# Patient Record
Sex: FEMALE | Race: WHITE
[De-identification: names, ages, dates, MRNs, and addresses within clinical notes are randomized per-mention and may not be internally consistent; named-entity substitution may affect disease eponyms.]

---

## 2017-04-13 ENCOUNTER — HOSPITAL ENCOUNTER (EMERGENCY)
Dept: HOSPITAL 83 - ED | Age: 82
Discharge: HOME | End: 2017-04-13
Payer: MEDICARE

## 2017-04-13 VITALS — HEIGHT: 55 IN | WEIGHT: 140 LBS

## 2017-04-13 VITALS — DIASTOLIC BLOOD PRESSURE: 90 MMHG

## 2017-04-13 DIAGNOSIS — M25.552: Primary | ICD-10-CM

## 2017-04-13 DIAGNOSIS — Z90.710: ICD-10-CM

## 2017-04-13 DIAGNOSIS — Z90.49: ICD-10-CM

## 2017-04-28 ENCOUNTER — HOSPITAL ENCOUNTER (INPATIENT)
Dept: HOSPITAL 83 - ED | Age: 82
LOS: 1 days | Discharge: HOME | DRG: 194 | End: 2017-04-29
Attending: EMERGENCY MEDICINE | Admitting: EMERGENCY MEDICINE
Payer: MEDICARE

## 2017-04-28 VITALS — BODY MASS INDEX: 29.69 KG/M2 | WEIGHT: 157.25 LBS | HEIGHT: 61 IN

## 2017-04-28 VITALS — SYSTOLIC BLOOD PRESSURE: 156 MMHG | DIASTOLIC BLOOD PRESSURE: 74 MMHG

## 2017-04-28 VITALS — DIASTOLIC BLOOD PRESSURE: 75 MMHG

## 2017-04-28 VITALS — DIASTOLIC BLOOD PRESSURE: 70 MMHG

## 2017-04-28 VITALS — DIASTOLIC BLOOD PRESSURE: 80 MMHG | SYSTOLIC BLOOD PRESSURE: 154 MMHG

## 2017-04-28 VITALS — SYSTOLIC BLOOD PRESSURE: 186 MMHG | DIASTOLIC BLOOD PRESSURE: 82 MMHG

## 2017-04-28 VITALS — SYSTOLIC BLOOD PRESSURE: 200 MMHG | DIASTOLIC BLOOD PRESSURE: 87 MMHG

## 2017-04-28 VITALS — DIASTOLIC BLOOD PRESSURE: 84 MMHG

## 2017-04-28 DIAGNOSIS — I16.1: ICD-10-CM

## 2017-04-28 DIAGNOSIS — E78.5: ICD-10-CM

## 2017-04-28 DIAGNOSIS — I47.1: ICD-10-CM

## 2017-04-28 DIAGNOSIS — G89.29: ICD-10-CM

## 2017-04-28 DIAGNOSIS — Z91.041: ICD-10-CM

## 2017-04-28 DIAGNOSIS — R09.1: Primary | ICD-10-CM

## 2017-04-28 DIAGNOSIS — I12.9: ICD-10-CM

## 2017-04-28 DIAGNOSIS — N18.3: ICD-10-CM

## 2017-04-28 DIAGNOSIS — F41.9: ICD-10-CM

## 2017-04-28 DIAGNOSIS — K21.9: ICD-10-CM

## 2017-04-28 DIAGNOSIS — G62.9: ICD-10-CM

## 2017-04-28 DIAGNOSIS — Z86.73: ICD-10-CM

## 2017-04-28 DIAGNOSIS — R07.89: ICD-10-CM

## 2017-04-28 LAB
25(OH)D3 SERPL-MCNC: 12.4 NG/ML (ref 30–100)
ALBUMIN SERPL-MCNC: 3.5 GM/DL (ref 3.1–4.5)
ALP SERPL-CCNC: 99 U/L (ref 45–117)
ALT SERPL W P-5'-P-CCNC: 17 U/L (ref 12–78)
AST SERPL-CCNC: 21 IU/L (ref 3–35)
BASOPHILS # BLD AUTO: 0.1 10*3/UL (ref 0–0.1)
BASOPHILS NFR BLD AUTO: 0.5 % (ref 0–1)
BUN SERPL-MCNC: 18 MG/DL (ref 7–24)
CHLORIDE SERPL-SCNC: 97 MMOL/L (ref 98–107)
CO2 SERPL-SCNC: 29 MMOL/L (ref 21–32)
EOSINOPHIL # BLD AUTO: 0.1 10*3/UL (ref 0–0.4)
EOSINOPHIL # BLD AUTO: 0.8 % (ref 1–4)
ERYTHROCYTE [DISTWIDTH] IN BLOOD BY AUTOMATED COUNT: 13.4 % (ref 0–14.5)
EST. AVERAGE GLUCOSE BLD GHB EST-MCNC: 105 MG/DL
FOLATE SERPL-MCNC: > 24 NG/ML (ref 5.38–?)
GLUCOSE SERPL-MCNC: 95 MG/DL (ref 65–99)
HCT VFR BLD AUTO: 36.6 % (ref 37–47)
HGB BLD-MCNC: 12 G/DL (ref 12–16)
IG #: 0.1 10*3/UL (ref 0–0.1)
INR BLD: 0.9 (ref 2–3.5)
LYMPHOCYTES # BLD AUTO: 0.9 10*3/UL (ref 1.3–4.4)
LYMPHOCYTES NFR BLD AUTO: 8.7 % (ref 27–41)
MAGNESIUM SERPL-MCNC: 2.5 MG/DL (ref 1.5–2.1)
MCH RBC QN AUTO: 30.9 PG (ref 27–31)
MCHC RBC AUTO-ENTMCNC: 32.8 G/DL (ref 33–37)
MCV RBC AUTO: 94.3 FL (ref 81–99)
MONOCYTES # BLD AUTO: 0.5 10*3/UL (ref 0.1–1)
MONOCYTES NFR BLD MANUAL: 5.1 % (ref 3–9)
NEUT #: 8.5 10*3/UL (ref 2.3–7.9)
NEUT %: 84.3 % (ref 47–73)
NRBC BLD QL AUTO: 0 % (ref 0–0)
PHOSPHATE SERPL-MCNC: 2.9 MG/DL (ref 2.5–4.9)
PLATELET # BLD AUTO: 182 10*3/UL (ref 130–400)
PMV BLD AUTO: 9.3 FL (ref 9.6–12.3)
POTASSIUM SERPL-SCNC: 3.8 MMOL/L (ref 3.5–5.1)
PROT SERPL-MCNC: 7.6 GM/DL (ref 6.4–8.2)
PROTHROMBIN TIME: 10 SECONDS (ref 9–12.4)
RBC # BLD AUTO: 3.88 10*6/UL (ref 4.1–5.1)
SODIUM SERPL-SCNC: 134 MMOL/L (ref 136–145)
T4 FREE SERPL-MCNC: 0.92 NG/DL (ref 0.76–1.46)
TSH SERPL DL<=0.005 MIU/L-ACNC: 1.09 UIU/ML (ref 0.36–4.75)
VITAMIN B12: 1021 PG/ML (ref 247–911)
WBC NRBC COR # BLD AUTO: 10.1 10*3/UL (ref 4.8–10.8)

## 2017-04-29 VITALS — SYSTOLIC BLOOD PRESSURE: 119 MMHG | DIASTOLIC BLOOD PRESSURE: 54 MMHG

## 2017-04-29 VITALS — SYSTOLIC BLOOD PRESSURE: 136 MMHG | DIASTOLIC BLOOD PRESSURE: 62 MMHG

## 2017-04-29 VITALS — DIASTOLIC BLOOD PRESSURE: 50 MMHG

## 2017-04-29 LAB
BASOPHILS # BLD AUTO: 0.1 10*3/UL (ref 0–0.1)
BASOPHILS NFR BLD AUTO: 0.8 % (ref 0–1)
BUN SERPL-MCNC: 14 MG/DL (ref 7–24)
CHLORIDE SERPL-SCNC: 96 MMOL/L (ref 98–107)
CO2 SERPL-SCNC: 32 MMOL/L (ref 21–32)
EOSINOPHIL # BLD AUTO: 0.4 10*3/UL (ref 0–0.4)
EOSINOPHIL # BLD AUTO: 5.7 % (ref 1–4)
ERYTHROCYTE [DISTWIDTH] IN BLOOD BY AUTOMATED COUNT: 13.5 % (ref 0–14.5)
GLUCOSE SERPL-MCNC: 82 MG/DL (ref 65–99)
HCT VFR BLD AUTO: 33.7 % (ref 37–47)
HGB BLD-MCNC: 10.8 G/DL (ref 12–16)
IG #: 0 10*3/UL (ref 0–0.1)
LYMPHOCYTES # BLD AUTO: 1.3 10*3/UL (ref 1.3–4.4)
LYMPHOCYTES NFR BLD AUTO: 19.8 % (ref 27–41)
MCH RBC QN AUTO: 30.3 PG (ref 27–31)
MCHC RBC AUTO-ENTMCNC: 32 G/DL (ref 33–37)
MCV RBC AUTO: 94.7 FL (ref 81–99)
MONOCYTES # BLD AUTO: 0.7 10*3/UL (ref 0.1–1)
MONOCYTES NFR BLD MANUAL: 11.1 % (ref 3–9)
NEUT #: 4 10*3/UL (ref 2.3–7.9)
NEUT %: 62 % (ref 47–73)
NRBC BLD QL AUTO: 0 10*3/UL (ref 0–0)
PLATELET # BLD AUTO: 189 10*3/UL (ref 130–400)
PMV BLD AUTO: 9.6 FL (ref 9.6–12.3)
POTASSIUM SERPL-SCNC: 3.2 MMOL/L (ref 3.5–5.1)
RBC # BLD AUTO: 3.56 10*6/UL (ref 4.1–5.1)
SODIUM SERPL-SCNC: 136 MMOL/L (ref 136–145)
TROPONIN I SERPL-MCNC: < 0.015 NG/ML (ref ?–0.04)
WBC NRBC COR # BLD AUTO: 6.5 10*3/UL (ref 4.8–10.8)

## 2017-05-04 ENCOUNTER — HOSPITAL ENCOUNTER (OUTPATIENT)
Dept: HOSPITAL 83 - RAD | Age: 82
Discharge: HOME | End: 2017-05-04
Attending: INTERNAL MEDICINE
Payer: MEDICARE

## 2017-05-04 DIAGNOSIS — M54.2: Primary | ICD-10-CM

## 2017-09-27 ENCOUNTER — HOSPITAL ENCOUNTER (OUTPATIENT)
Dept: HOSPITAL 83 - LAB | Age: 82
Discharge: HOME | End: 2017-09-27
Payer: MEDICARE

## 2017-09-27 DIAGNOSIS — I47.1: Primary | ICD-10-CM

## 2017-09-27 LAB
ALBUMIN SERPL-MCNC: 3.9 GM/DL (ref 3.1–4.5)
ALP SERPL-CCNC: 82 U/L (ref 45–117)
ALT SERPL W P-5'-P-CCNC: 29 U/L (ref 12–78)
AST SERPL-CCNC: 43 IU/L (ref 3–35)
BASOPHILS # BLD AUTO: 0.1 10*3/UL (ref 0–0.1)
BASOPHILS NFR BLD AUTO: 1.1 % (ref 0–1)
BUN SERPL-MCNC: 12 MG/DL (ref 7–24)
CHLORIDE SERPL-SCNC: 101 MMOL/L (ref 98–107)
CREAT SERPL-MCNC: 1.27 MG/DL (ref 0.55–1.02)
DIGOXIN SERPL-MCNC: 1.25 NG/ML (ref 0.8–2)
EOSINOPHIL # BLD AUTO: 0.1 10*3/UL (ref 0–0.4)
EOSINOPHIL # BLD AUTO: 0.8 % (ref 1–4)
ERYTHROCYTE [DISTWIDTH] IN BLOOD BY AUTOMATED COUNT: 12.7 % (ref 0–14.5)
HCT VFR BLD AUTO: 41.3 % (ref 37–47)
HGB BLD-MCNC: 13.2 G/DL (ref 12–16)
LYMPHOCYTES # BLD AUTO: 1.5 10*3/UL (ref 1.3–4.4)
LYMPHOCYTES NFR BLD AUTO: 22.9 % (ref 27–41)
MCH RBC QN AUTO: 30.3 PG (ref 27–31)
MCHC RBC AUTO-ENTMCNC: 32 G/DL (ref 33–37)
MCV RBC AUTO: 94.7 FL (ref 81–99)
MONOCYTES # BLD AUTO: 0.5 10*3/UL (ref 0.1–1)
MONOCYTES NFR BLD MANUAL: 8.2 % (ref 3–9)
NEUT #: 4.2 10*3/UL (ref 2.3–7.9)
NEUT %: 66.8 % (ref 47–73)
NRBC BLD QL AUTO: 0 10*3/UL (ref 0–0)
PLATELET # BLD AUTO: 209 10*3/UL (ref 130–400)
PMV BLD AUTO: 9.9 FL (ref 9.6–12.3)
POTASSIUM SERPL-SCNC: 3.1 MMOL/L (ref 3.5–5.1)
PROT SERPL-MCNC: 7.8 GM/DL (ref 6.4–8.2)
RBC # BLD AUTO: 4.36 10*6/UL (ref 4.1–5.1)
SODIUM SERPL-SCNC: 140 MMOL/L (ref 136–145)
WBC NRBC COR # BLD AUTO: 6.3 10*3/UL (ref 4.8–10.8)

## 2017-11-24 ENCOUNTER — HOSPITAL ENCOUNTER (INPATIENT)
Dept: HOSPITAL 83 - ED | Age: 82
LOS: 3 days | Discharge: HOME | DRG: 281 | End: 2017-11-27
Attending: INTERNAL MEDICINE | Admitting: INTERNAL MEDICINE
Payer: MEDICARE

## 2017-11-24 VITALS — DIASTOLIC BLOOD PRESSURE: 80 MMHG

## 2017-11-24 VITALS — DIASTOLIC BLOOD PRESSURE: 51 MMHG | SYSTOLIC BLOOD PRESSURE: 128 MMHG

## 2017-11-24 VITALS — DIASTOLIC BLOOD PRESSURE: 83 MMHG | SYSTOLIC BLOOD PRESSURE: 124 MMHG

## 2017-11-24 VITALS — SYSTOLIC BLOOD PRESSURE: 124 MMHG | DIASTOLIC BLOOD PRESSURE: 78 MMHG

## 2017-11-24 VITALS — SYSTOLIC BLOOD PRESSURE: 125 MMHG | DIASTOLIC BLOOD PRESSURE: 80 MMHG

## 2017-11-24 VITALS — BODY MASS INDEX: 27.12 KG/M2 | HEIGHT: 60 IN | WEIGHT: 138.13 LBS

## 2017-11-24 VITALS — DIASTOLIC BLOOD PRESSURE: 80 MMHG | SYSTOLIC BLOOD PRESSURE: 125 MMHG

## 2017-11-24 VITALS — SYSTOLIC BLOOD PRESSURE: 128 MMHG | DIASTOLIC BLOOD PRESSURE: 51 MMHG

## 2017-11-24 VITALS — DIASTOLIC BLOOD PRESSURE: 78 MMHG

## 2017-11-24 VITALS — DIASTOLIC BLOOD PRESSURE: 82 MMHG | SYSTOLIC BLOOD PRESSURE: 128 MMHG

## 2017-11-24 DIAGNOSIS — R73.9: ICD-10-CM

## 2017-11-24 DIAGNOSIS — N18.3: ICD-10-CM

## 2017-11-24 DIAGNOSIS — D64.9: ICD-10-CM

## 2017-11-24 DIAGNOSIS — I50.32: ICD-10-CM

## 2017-11-24 DIAGNOSIS — Z79.899: ICD-10-CM

## 2017-11-24 DIAGNOSIS — E66.3: ICD-10-CM

## 2017-11-24 DIAGNOSIS — B34.9: ICD-10-CM

## 2017-11-24 DIAGNOSIS — Z98.49: ICD-10-CM

## 2017-11-24 DIAGNOSIS — N17.9: ICD-10-CM

## 2017-11-24 DIAGNOSIS — M48.00: ICD-10-CM

## 2017-11-24 DIAGNOSIS — E55.9: ICD-10-CM

## 2017-11-24 DIAGNOSIS — E44.1: ICD-10-CM

## 2017-11-24 DIAGNOSIS — Z79.82: ICD-10-CM

## 2017-11-24 DIAGNOSIS — E86.0: ICD-10-CM

## 2017-11-24 DIAGNOSIS — Z90.49: ICD-10-CM

## 2017-11-24 DIAGNOSIS — G89.29: ICD-10-CM

## 2017-11-24 DIAGNOSIS — F41.9: ICD-10-CM

## 2017-11-24 DIAGNOSIS — E78.5: ICD-10-CM

## 2017-11-24 DIAGNOSIS — K52.9: ICD-10-CM

## 2017-11-24 DIAGNOSIS — I13.0: ICD-10-CM

## 2017-11-24 DIAGNOSIS — G62.9: ICD-10-CM

## 2017-11-24 DIAGNOSIS — Z82.49: ICD-10-CM

## 2017-11-24 DIAGNOSIS — Z86.73: ICD-10-CM

## 2017-11-24 DIAGNOSIS — I48.0: ICD-10-CM

## 2017-11-24 DIAGNOSIS — I21.A1: Primary | ICD-10-CM

## 2017-11-24 DIAGNOSIS — Z82.3: ICD-10-CM

## 2017-11-24 DIAGNOSIS — Z91.041: ICD-10-CM

## 2017-11-24 DIAGNOSIS — Z90.710: ICD-10-CM

## 2017-11-24 DIAGNOSIS — E87.6: ICD-10-CM

## 2017-11-24 DIAGNOSIS — K21.9: ICD-10-CM

## 2017-11-24 LAB
ALBUMIN SERPL-MCNC: 3.3 GM/DL (ref 3.1–4.5)
ALP SERPL-CCNC: 76 U/L (ref 45–117)
ALT SERPL W P-5'-P-CCNC: 15 U/L (ref 12–78)
APTT PPP: 25.2 SECONDS (ref 20.8–31.5)
AST SERPL-CCNC: 17 IU/L (ref 3–35)
BASOPHILS # BLD AUTO: 0 10*3/UL (ref 0–0.1)
BASOPHILS NFR BLD AUTO: 0.3 % (ref 0–1)
BUN SERPL-MCNC: 24 MG/DL (ref 7–24)
CHLORIDE SERPL-SCNC: 102 MMOL/L (ref 98–107)
CREAT SERPL-MCNC: 1.1 MG/DL (ref 0.55–1.02)
EOSINOPHIL # BLD AUTO: 0 10*3/UL (ref 0–0.4)
EOSINOPHIL # BLD AUTO: 0.2 % (ref 1–4)
ERYTHROCYTE [DISTWIDTH] IN BLOOD BY AUTOMATED COUNT: 13.3 % (ref 0–14.5)
HCT VFR BLD AUTO: 39.2 % (ref 37–47)
HGB BLD-MCNC: 12.6 G/DL (ref 12–16)
INR BLD: 1 (ref 2–3.5)
LYMPHOCYTES # BLD AUTO: 0.3 10*3/UL (ref 1.3–4.4)
LYMPHOCYTES NFR BLD AUTO: 4.4 % (ref 27–41)
MCH RBC QN AUTO: 30.5 PG (ref 27–31)
MCHC RBC AUTO-ENTMCNC: 32.1 G/DL (ref 33–37)
MCV RBC AUTO: 94.9 FL (ref 81–99)
MONOCYTES # BLD AUTO: 0.3 10*3/UL (ref 0.1–1)
MONOCYTES NFR BLD MANUAL: 5.6 % (ref 3–9)
NEUT #: 5.3 10*3/UL (ref 2.3–7.9)
NEUT %: 89.3 % (ref 47–73)
NRBC BLD QL AUTO: 0 10*3/UL (ref 0–0)
PLATELET # BLD AUTO: 156 10*3/UL (ref 130–400)
PMV BLD AUTO: 10.3 FL (ref 9.6–12.3)
POTASSIUM SERPL-SCNC: 3.8 MMOL/L (ref 3.5–5.1)
PROT SERPL-MCNC: 7.6 GM/DL (ref 6.4–8.2)
RBC # BLD AUTO: 4.13 10*6/UL (ref 4.1–5.1)
SODIUM SERPL-SCNC: 139 MMOL/L (ref 136–145)
TROPONIN I SERPL-MCNC: < 0.015 NG/ML (ref ?–0.04)
WBC NRBC COR # BLD AUTO: 5.9 10*3/UL (ref 4.8–10.8)

## 2017-11-24 NOTE — PR
[place], [place]
 
                                      PROGRESS NOTE
 
        NAME: ELI ROSENTHAL                   ACCT #: [id number]  
        UNIT #: [id number]                       ROOM: [facility]-    
        DOCTOR: SHANITA COOK MD              BIRTHDATE: [birth year]
 
 
DOS: 11/27/2017
 
SUBJECTIVE:  The patient is sitting up in bed, does not appear in distress.  She
is slightly hard of hearing, but responds appropriately to verbal command.  No
symptomatic palpitation.  No chest pain.  No chest pressure.
 
OBJECTIVE:
VITAL SIGNS:  Blood pressure 145/67, heart rate 65, respiratory rate of 14,
temperature 97.5.
NECK:  Good upstroke.  No bruit.
HEART:  S1, S2 with no rub.
LUNGS:  Clear to auscultation.
ABDOMEN:  Soft, nontender, present bowel sounds.
LOWER EXTREMITIES:  No edema.
 
LABORATORY DATA:  White count 5.5, hemoglobin 11.6.  Potassium 3.6, GFR more
than 60%.
 
ASSESSMENT AND PLAN:  Chest pain with abnormal troponin in a patient who had a
completely normal stress test in March of this year.  The patient currently is
pain free.  Echocardiogram is still pending.  The patient on proper medication
with no room to titrate beta blocker.  Our plan at this time is to hold on any
further workup pending the results of the echocardiogram.  Should the
echocardiogram show normal LV function, no wall motion abnormalities, the
patient can be discharged home with early followup with  ____ as an
outpatient within 1-2 weeks.  The patient was advised to call back for any
recurrent chest pain at any time.
 
 
 
_________________________________
SHANITA COOK MD
 
CM:PNTRANS
 
D: 11/27/17 1040                 
T: 11/27/17 1118
             
                                                            
SHANITA COOK MD              
                                                            
11/29/17
1548
                              
interface

## 2017-11-24 NOTE — NUR
A 83, admitted to , under the
services of JENNIFER Blancas DO with a diagnosis of CHEST PAIN, ACUTE
KIDNEY INJURY, AFIB, HYPOKALCEMIA.
Chief complaint is CHEST PAIN.
Patient arrived via bed from ER.
Monitor applied. Initial assessment completed.
Vital signs taken and recorded.
JENNIFER BLANCAS DO notified of admission to the unit.
Orders received.
See assessment for past medical history, medications
and allergies.
Patient and/or family oriented to unit. [facility] reviewed.
Clothing/patient valuable form completed.
 
CRISTO FLORES

## 2017-11-24 NOTE — NUR
PT MEDICATED FOR PAIN PER DOCTORS ORDERS. PRIOR TO MED ADMINISTRATION PT RATES
PAIN 8/10. PT RESTING IN BED. VSS. PULSE OX APPLIED. WILL CONTINUE TO MONITOR.

## 2017-11-24 NOTE — NUR
DR YUN NOTIFIED OF ELEVATED TROPONIN. INSTRUCTED TO MAKE SURE CARDIO IS
AWARE. DR LOMELI RETURNED PAGE AND SAID WE DO NOT NEED TO CALL WITH ANYMORE
ELEVATED TROPONINS UNLESS SHE IS HAVING CHEST PAIN.

## 2017-11-24 NOTE — NUR
ANSWERING SERVICE WAS NOTIFIED OF DR. LORA
CONSULT.  RESPONSE OF NOTIFICATION
WAS OKAY THANK YOU.
 
CRISTO FLORES

## 2017-11-25 VITALS — DIASTOLIC BLOOD PRESSURE: 68 MMHG

## 2017-11-25 VITALS — DIASTOLIC BLOOD PRESSURE: 64 MMHG | SYSTOLIC BLOOD PRESSURE: 131 MMHG

## 2017-11-25 VITALS — DIASTOLIC BLOOD PRESSURE: 66 MMHG

## 2017-11-25 VITALS — DIASTOLIC BLOOD PRESSURE: 56 MMHG | SYSTOLIC BLOOD PRESSURE: 126 MMHG

## 2017-11-25 VITALS — DIASTOLIC BLOOD PRESSURE: 74 MMHG

## 2017-11-25 LAB
25(OH)D3 SERPL-MCNC: 25.3 NG/ML (ref 30–100)
ALBUMIN SERPL-MCNC: 3 GM/DL (ref 3.1–4.5)
ALP SERPL-CCNC: 66 U/L (ref 45–117)
ALT SERPL W P-5'-P-CCNC: 15 U/L (ref 12–78)
APPEARANCE UR: (no result)
APTT PPP: 29.7 SECONDS (ref 20.8–31.5)
AST SERPL-CCNC: 18 IU/L (ref 3–35)
BASOPHILS # BLD AUTO: 0 10*3/UL (ref 0–0.1)
BASOPHILS NFR BLD AUTO: 0.2 % (ref 0–1)
BILIRUB UR QL STRIP: NEGATIVE
BUN SERPL-MCNC: 25 MG/DL (ref 7–24)
CHLORIDE SERPL-SCNC: 103 MMOL/L (ref 98–107)
CHOLEST SERPL-MCNC: 196 MG/DL (ref ?–200)
COLOR UR: YELLOW
CREAT SERPL-MCNC: 1.12 MG/DL (ref 0.55–1.02)
EOSINOPHIL # BLD AUTO: 0 10*3/UL (ref 0–0.4)
EOSINOPHIL # BLD AUTO: 0.2 % (ref 1–4)
ERYTHROCYTE [DISTWIDTH] IN BLOOD BY AUTOMATED COUNT: 13.6 % (ref 0–14.5)
GLUCOSE UR QL: NEGATIVE
HCT VFR BLD AUTO: 36.7 % (ref 37–47)
HDLC SERPL-MCNC: 53 MG/DL (ref 40–60)
HGB BLD-MCNC: 11.6 G/DL (ref 12–16)
HGB UR QL STRIP: NEGATIVE
INR BLD: 1 (ref 2–3.5)
KETONES UR QL STRIP: (no result)
LDLC SERPL DIRECT ASSAY-MCNC: 113 MG/DL (ref 9–159)
LEUKOCYTE ESTERASE UR QL STRIP: NEGATIVE
LYMPHOCYTES # BLD AUTO: 0.6 10*3/UL (ref 1.3–4.4)
LYMPHOCYTES NFR BLD AUTO: 10.3 % (ref 27–41)
MCH RBC QN AUTO: 29.9 PG (ref 27–31)
MCHC RBC AUTO-ENTMCNC: 31.6 G/DL (ref 33–37)
MCV RBC AUTO: 94.6 FL (ref 81–99)
MONOCYTES # BLD AUTO: 0.4 10*3/UL (ref 0.1–1)
MONOCYTES NFR BLD MANUAL: 7 % (ref 3–9)
MUCOUS THREADS URNS QL MICRO: (no result)
NEUT #: 4.5 10*3/UL (ref 2.3–7.9)
NEUT %: 82.1 % (ref 47–73)
NITRITE UR QL STRIP: NEGATIVE
NRBC BLD QL AUTO: 0 10*3/UL (ref 0–0)
PH UR STRIP: 6.5 [PH] (ref 5–9)
PHOSPHATE SERPL-MCNC: 2.2 MG/DL (ref 2.5–4.9)
PLATELET # BLD AUTO: 140 10*3/UL (ref 130–400)
PMV BLD AUTO: 10.4 FL (ref 9.6–12.3)
POTASSIUM SERPL-SCNC: 3.2 MMOL/L (ref 3.5–5.1)
PROT SERPL-MCNC: 6.6 GM/DL (ref 6.4–8.2)
RBC # BLD AUTO: 3.88 10*6/UL (ref 4.1–5.1)
RBC #/AREA URNS HPF: (no result) RBC/HPF (ref 0–2)
SODIUM SERPL-SCNC: 140 MMOL/L (ref 136–145)
SP GR UR: 1.02 (ref 1–1.03)
T4 FREE SERPL-MCNC: 0.9 NG/DL (ref 0.76–1.46)
TRIGL SERPL-MCNC: 149 MG/DL (ref ?–150)
TSH SERPL DL<=0.005 MIU/L-ACNC: 0.45 UIU/ML (ref 0.36–4.75)
UROBILINOGEN UR STRIP-MCNC: 0.2 E.U./DL (ref 0.2–1)
VITAMIN B12: 1018 PG/ML (ref 247–911)
VLDLC SERPL CALC-MCNC: 30 MG/DL (ref 6–40)
WBC #/AREA URNS HPF: (no result) WBC/HPF (ref 0–5)
WBC NRBC COR # BLD AUTO: 5.5 10*3/UL (ref 4.8–10.8)

## 2017-11-25 NOTE — NUR
PATIENT STATES EARLIER ATIVAN "HELPED A LOT"
PATIENT RESTING QUIETLY IN BED, RESPIRATIONS EASY/REG. NO SXS OF DISTRESS.
FLUIDS AND CARDIZEM GTT MAINTAINED PER ORDER.

## 2017-11-25 NOTE — NUR
PATIENT MOVED TO THE ICCU WITH ALL BELONGINGS. DAUGHTER WAS NOTIFIED OF THE
PATIENTS CHANGE. VSS STABLE, PATIENT RESTING COMFORTABLY.

## 2017-11-25 NOTE — NUR
PT MEDICATED WITH ATIVAN 0.5MG PO FOR ANXIETY AND RESTLESSNESS. GRANDAUGHTER
AT BEDSIDE AND STATES SHE ALWAYS GETS LIKE THIS WHEN SHE IS IN THE HOSPITAL.

## 2017-11-25 NOTE — NUR
LAB CALLED WITH TROP 0.177. DR LOMELI REQUESTED NOT TO BE NOTIFIED UNLESS
PATIENT IS HAVING CHEST PAIN.

## 2017-11-25 NOTE — NUR
PT REQUESTING "SOMETHING TO SETTLE MY NERVES".... I PHONED DR BAEZ AND
RECEIVED ORDER.  CONVEYED TO PT.

## 2017-11-25 NOTE — NUR
DR LOMELI MADE AWARE OF PT'S IODINE ALLERGY WITH REGARDS TO HER ORDERED CTA.
PREDNISONE PREP FOR 13 HOURS ORDERED. CT TECH NOTIFIED AND WILL PLAN FOR CTA
APPROX 4AM TOMORROW. FAMILY AWARE.

## 2017-11-25 NOTE — NUR
DR MARROQUIN MADE AWARE OF PT BECOMING INCREASINGLY ANXIOUS AND AGITATED. PT
REFUSING TO USE BSC DESPITE STAFF EXPLAINING THAT CURRENTLY THERE ARE NO ICU
ROOMS AVAILABLE WITH A BATHROOM. THEN YELLING AT STAFF TO JUST GO AWAY BECAUSE
SHE JUST WANTS TO GO HOME AND NO ONE IS EXPLAINING THINGS TO HER, EVEN THOUGH
DR MARROQUIN WAS LITERALLY AT HER BEDSIDE 2 MINUTES AGO EXPLAINING WHY SHE WAS
SENT TO ICCU.

## 2017-11-25 NOTE — NUR
ATIVAN WAS GIVEN AT 1935 AS ORDERED.  PT COOPERATIVE AND PLEASANT.  I ASSESSED
HER WHILE IN HER CHAIR AND ASSISTED HER BACK TO BED.  CALL LIGHT IN REACH AND
INSTRUCTED PT TO CALL ME IF SHE NEEDS TO GET UP TO BSC.  BED EXIT ALARM ON FOR
SAFETY AS WELL.  PT STATES ATIVAN IS ALREADY EFFECTIVE AND SHE IS FEELING MUCH
CALMER.

## 2017-11-26 VITALS — DIASTOLIC BLOOD PRESSURE: 59 MMHG | SYSTOLIC BLOOD PRESSURE: 123 MMHG

## 2017-11-26 VITALS — DIASTOLIC BLOOD PRESSURE: 70 MMHG

## 2017-11-26 VITALS — DIASTOLIC BLOOD PRESSURE: 60 MMHG

## 2017-11-26 VITALS — DIASTOLIC BLOOD PRESSURE: 61 MMHG

## 2017-11-26 VITALS — SYSTOLIC BLOOD PRESSURE: 125 MMHG | DIASTOLIC BLOOD PRESSURE: 65 MMHG

## 2017-11-26 VITALS — DIASTOLIC BLOOD PRESSURE: 63 MMHG

## 2017-11-26 LAB
BUN SERPL-MCNC: 23 MG/DL (ref 7–24)
CHLORIDE SERPL-SCNC: 102 MMOL/L (ref 98–107)
CREAT SERPL-MCNC: 0.98 MG/DL (ref 0.55–1.02)
POTASSIUM SERPL-SCNC: 3.6 MMOL/L (ref 3.5–5.1)
SODIUM SERPL-SCNC: 137 MMOL/L (ref 136–145)

## 2017-11-26 NOTE — NUR
MORPHINE FOR BACK.LEG PAIN HAS BEEN INEFFECTIVE
TYLENOL/ZOFRAN/ATIVAN NOW GIVEN FOR PAIN/ANXIETY/GITTERU STOMACH
 IN ROOM AND REQUESTING TO SPEAK WITH THE DR DR PURI UPDATED ON THIS

## 2017-11-26 NOTE — NUR
PT. GIVEN PERCOSET AND ATIVAN AS ORDERED AT 2007 AS PER REQUEST FOR PAIN MED
FOR GENERALIZED ACHES AND PAINS.  ATIVAN GIVEN AS ORDERED PER REQUEST FOR
ANXIETY MED.  PT. UP TO BSC WITH ASSIST.  HEP LOCK IN LUIS F AND RA ASYMPT. LUNGS
CLEAR BILAT, PULSE OX 97% ON RA. ABDOMEN SOFT, NONDISTENDED AND NORMO. TRACE
BILAT LOWER EXTREMITY EDEMA.
BOLA ALVAREZ RN

## 2017-11-27 VITALS — DIASTOLIC BLOOD PRESSURE: 67 MMHG | SYSTOLIC BLOOD PRESSURE: 145 MMHG

## 2017-11-27 VITALS — DIASTOLIC BLOOD PRESSURE: 64 MMHG | SYSTOLIC BLOOD PRESSURE: 157 MMHG

## 2017-11-27 VITALS — DIASTOLIC BLOOD PRESSURE: 61 MMHG

## 2017-11-27 VITALS — SYSTOLIC BLOOD PRESSURE: 126 MMHG | DIASTOLIC BLOOD PRESSURE: 57 MMHG

## 2017-11-27 VITALS — DIASTOLIC BLOOD PRESSURE: 63 MMHG

## 2017-11-27 NOTE — NUR
PT DISCHARGED HOME, HOME CARE AND NEW MEDS DISCUSSED WITH STR THAT WAS HERE
AND SHE VERBALIZED GOOD UNDERSTANDING, RESIDENT CLINIC NUMBER ALSO GIVEN TO PT
AT HER REQUEST

## 2017-11-27 NOTE — NUR
PERCOCET FOR 10/10 JOINT PAIN GINGER HER KNEES
IV ATIVAN FOR SEVERE AGITATION, PT IS FURIOUS THAT SHE HAS NOT BEEN DISCHARGED
YET

## 2017-11-27 NOTE — NUR
SPOKE WITH VESTA FROM [facility] AND SHE RECIEVED A TEXT FROM DR COOK THAT
PTS ECHO WAS OK, AND PT CAN GO HOME TODAY FROM CARDIOLOGY POINT OF VIEW
DR PURI UPDATED

## 2017-11-27 NOTE — NUR
in to talk to patient.
Patient states lives at home with .
There are few steps in the home.
Physician: conrad gordillo
Pharmacy: [facility] health services: none
Patient's level of ADLs: INDEPENDENT
Patient has working utilities: all working
DME: has a cane and walker
Follow-up physician's appointment after d/c: will be made by hospitalist nurse
director upon discharge
Does patient want to access PORTAL?: no
Discharge plan discussed with patient,  and daughter present, patient
lives at home with , she states she is independent in adls and
ambulation, patient states she will be going home when able, discussed with her
[facility] and she refused any services at this time.
 
SEBASTIAN HASSAN

## 2017-11-27 NOTE — NUR
PTS FAMILY ADVISED OF HER PROBABLY NOT BEING DISCHARGED TODAY
PTS OTHER DTR, CALLED CARDIOLOGY SERVICE ON HER OWN
DR COOK CALLED IN AND WAS UPSET ON PT NOT BEING DISCHARGED AND DR PURI, WHO
WAS HERE IN THE UNIT TO SPEAK WITH THIS FAMILY, SPOKE WITH DR COOK ON THE
PHONE

## 2018-01-12 ENCOUNTER — HOSPITAL ENCOUNTER (OUTPATIENT)
Dept: HOSPITAL 83 - LAB | Age: 83
Discharge: HOME | End: 2018-01-12
Attending: INTERNAL MEDICINE
Payer: MEDICARE

## 2018-01-12 DIAGNOSIS — I10: Primary | ICD-10-CM

## 2018-01-12 DIAGNOSIS — E78.2: ICD-10-CM

## 2018-01-12 LAB
ALBUMIN SERPL-MCNC: 3.3 GM/DL (ref 3.1–4.5)
ALP SERPL-CCNC: 75 U/L (ref 45–117)
ALT SERPL W P-5'-P-CCNC: 13 U/L (ref 12–78)
AST SERPL-CCNC: 16 IU/L (ref 3–35)
BASOPHILS # BLD AUTO: 0.1 10*3/UL (ref 0–0.1)
BASOPHILS NFR BLD AUTO: 2 % (ref 0–1)
BUN SERPL-MCNC: 23 MG/DL (ref 7–24)
CHLORIDE SERPL-SCNC: 103 MMOL/L (ref 98–107)
CHOLEST SERPL-MCNC: 263 MG/DL (ref ?–200)
CREAT SERPL-MCNC: 1.32 MG/DL (ref 0.55–1.02)
EOSINOPHIL # BLD AUTO: 0.3 10*3/UL (ref 0–0.4)
EOSINOPHIL # BLD AUTO: 6.3 % (ref 1–4)
ERYTHROCYTE [DISTWIDTH] IN BLOOD BY AUTOMATED COUNT: 13.7 % (ref 0–14.5)
HCT VFR BLD AUTO: 34.4 % (ref 37–47)
HDLC SERPL-MCNC: 58 MG/DL (ref 40–60)
HGB BLD-MCNC: 10.8 G/DL (ref 12–16)
LDLC SERPL DIRECT ASSAY-MCNC: 170 MG/DL (ref 9–159)
LYMPHOCYTES # BLD AUTO: 1.9 10*3/UL (ref 1.3–4.4)
LYMPHOCYTES NFR BLD AUTO: 37.5 % (ref 27–41)
MCH RBC QN AUTO: 29.9 PG (ref 27–31)
MCHC RBC AUTO-ENTMCNC: 31.4 G/DL (ref 33–37)
MCV RBC AUTO: 95.3 FL (ref 81–99)
MONOCYTES # BLD AUTO: 0.5 10*3/UL (ref 0.1–1)
MONOCYTES NFR BLD MANUAL: 9.9 % (ref 3–9)
NEUT #: 2.2 10*3/UL (ref 2.3–7.9)
NEUT %: 44.1 % (ref 47–73)
NRBC BLD QL AUTO: 0 10*3/UL (ref 0–0)
PLATELET # BLD AUTO: 179 10*3/UL (ref 130–400)
PMV BLD AUTO: 10.1 FL (ref 9.6–12.3)
POTASSIUM SERPL-SCNC: 3.7 MMOL/L (ref 3.5–5.1)
PROT SERPL-MCNC: 7.1 GM/DL (ref 6.4–8.2)
RBC # BLD AUTO: 3.61 10*6/UL (ref 4.1–5.1)
SODIUM SERPL-SCNC: 143 MMOL/L (ref 136–145)
TRIGL SERPL-MCNC: 173 MG/DL (ref ?–150)
TSH SERPL DL<=0.005 MIU/L-ACNC: 1.74 UIU/ML (ref 0.36–4.75)
VLDLC SERPL CALC-MCNC: 35 MG/DL (ref 6–40)
WBC NRBC COR # BLD AUTO: 5.1 10*3/UL (ref 4.8–10.8)

## 2018-01-18 ENCOUNTER — HOSPITAL ENCOUNTER (OUTPATIENT)
Dept: HOSPITAL 83 - LAB | Age: 83
Discharge: HOME | End: 2018-01-18
Attending: INTERNAL MEDICINE
Payer: MEDICARE

## 2018-01-18 DIAGNOSIS — D64.9: Primary | ICD-10-CM

## 2018-01-18 LAB — VITAMIN B12: 1511 PG/ML (ref 247–911)

## 2018-02-19 ENCOUNTER — HOSPITAL ENCOUNTER (OUTPATIENT)
Dept: HOSPITAL 83 - RAD | Age: 83
Discharge: HOME | End: 2018-02-19
Attending: INTERNAL MEDICINE
Payer: MEDICARE

## 2018-02-19 DIAGNOSIS — M25.552: ICD-10-CM

## 2018-02-19 DIAGNOSIS — M25.511: Primary | ICD-10-CM

## 2018-04-17 ENCOUNTER — HOSPITAL ENCOUNTER (OUTPATIENT)
Dept: HOSPITAL 83 - LAB | Age: 83
Discharge: HOME | End: 2018-04-17
Attending: ORTHOPAEDIC SURGERY
Payer: MEDICARE

## 2018-04-17 DIAGNOSIS — R35.0: ICD-10-CM

## 2018-04-17 DIAGNOSIS — D64.9: ICD-10-CM

## 2018-04-17 DIAGNOSIS — I10: ICD-10-CM

## 2018-04-17 DIAGNOSIS — Z01.818: Primary | ICD-10-CM

## 2018-04-17 DIAGNOSIS — E78.00: ICD-10-CM

## 2018-04-17 DIAGNOSIS — Z86.73: ICD-10-CM

## 2018-04-17 DIAGNOSIS — R91.8: ICD-10-CM

## 2018-04-17 LAB
ALBUMIN SERPL-MCNC: 3.7 GM/DL (ref 3.1–4.5)
ALP SERPL-CCNC: 87 U/L (ref 45–117)
ALT SERPL W P-5'-P-CCNC: 20 U/L (ref 12–78)
APPEARANCE UR: (no result)
APTT PPP: 19.9 SECONDS (ref 20.8–31.5)
AST SERPL-CCNC: 17 IU/L (ref 3–35)
BACTERIA #/AREA URNS HPF: (no result) /[HPF]
BASOPHILS # BLD AUTO: 0 10*3/UL (ref 0–0.1)
BASOPHILS NFR BLD AUTO: 0.2 % (ref 0–1)
BILIRUB UR QL STRIP: NEGATIVE
BUN SERPL-MCNC: 28 MG/DL (ref 7–24)
CHLORIDE SERPL-SCNC: 102 MMOL/L (ref 98–107)
COLOR UR: YELLOW
CREAT SERPL-MCNC: 1.19 MG/DL (ref 0.55–1.02)
EOSINOPHIL # BLD AUTO: 0 10*3/UL (ref 0–0.4)
EOSINOPHIL # BLD AUTO: 0.4 % (ref 1–4)
ERYTHROCYTE [DISTWIDTH] IN BLOOD BY AUTOMATED COUNT: 14.1 % (ref 0–14.5)
GLUCOSE UR QL: NEGATIVE
HCT VFR BLD AUTO: 41.1 % (ref 37–47)
HGB BLD-MCNC: 12.7 G/DL (ref 12–16)
HGB UR QL STRIP: NEGATIVE
INR BLD: 0.9 (ref 2–3.5)
KETONES UR QL STRIP: NEGATIVE
LEUKOCYTE ESTERASE UR QL STRIP: NEGATIVE
LYMPHOCYTES # BLD AUTO: 2.2 10*3/UL (ref 1.3–4.4)
LYMPHOCYTES NFR BLD AUTO: 21.2 % (ref 27–41)
MCH RBC QN AUTO: 29.7 PG (ref 27–31)
MCHC RBC AUTO-ENTMCNC: 30.9 G/DL (ref 33–37)
MCV RBC AUTO: 96 FL (ref 81–99)
MONOCYTES # BLD AUTO: 1 10*3/UL (ref 0.1–1)
MONOCYTES NFR BLD MANUAL: 9.7 % (ref 3–9)
MUCOUS THREADS URNS QL MICRO: (no result)
NEUT #: 6.8 10*3/UL (ref 2.3–7.9)
NEUT %: 67.1 % (ref 47–73)
NITRITE UR QL STRIP: NEGATIVE
NRBC BLD QL AUTO: 0 10*3/UL (ref 0–0)
PH UR STRIP: 5.5 [PH] (ref 5–9)
PLATELET # BLD AUTO: 279 10*3/UL (ref 130–400)
PMV BLD AUTO: 9.7 FL (ref 9.6–12.3)
POTASSIUM SERPL-SCNC: 3.4 MMOL/L (ref 3.5–5.1)
PROT SERPL-MCNC: 7.5 GM/DL (ref 6.4–8.2)
RBC # BLD AUTO: 4.28 10*6/UL (ref 4.1–5.1)
RBC #/AREA URNS HPF: (no result) RBC/HPF (ref 0–2)
SODIUM SERPL-SCNC: 141 MMOL/L (ref 136–145)
SP GR UR: 1.02 (ref 1–1.03)
UROBILINOGEN UR STRIP-MCNC: 0.2 E.U./DL (ref 0.2–1)
WBC #/AREA URNS HPF: (no result) WBC/HPF (ref 0–5)
WBC NRBC COR # BLD AUTO: 10.2 10*3/UL (ref 4.8–10.8)

## 2018-04-23 ENCOUNTER — HOSPITAL ENCOUNTER (EMERGENCY)
Dept: HOSPITAL 83 - ED | Age: 83
Discharge: HOME | End: 2018-04-23
Payer: MEDICARE

## 2018-04-23 VITALS — SYSTOLIC BLOOD PRESSURE: 132 MMHG | DIASTOLIC BLOOD PRESSURE: 62 MMHG

## 2018-04-23 VITALS — BODY MASS INDEX: 24.48 KG/M2 | HEIGHT: 61.97 IN | WEIGHT: 133 LBS

## 2018-04-23 DIAGNOSIS — S01.01XA: Primary | ICD-10-CM
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
## 2018-04-23 DIAGNOSIS — S00.90XA: ICD-10-CM
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
## 2018-04-23 DIAGNOSIS — Z79.899: ICD-10-CM
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
## 2018-06-17 ENCOUNTER — HOSPITAL ENCOUNTER (EMERGENCY)
Dept: HOSPITAL 83 - ED | Age: 83
Discharge: HOME | End: 2018-06-17
Payer: MEDICARE

## 2018-06-17 VITALS — HEIGHT: 55 IN | WEIGHT: 160 LBS

## 2018-06-17 VITALS — SYSTOLIC BLOOD PRESSURE: 123 MMHG | DIASTOLIC BLOOD PRESSURE: 52 MMHG

## 2018-06-17 DIAGNOSIS — E78.5: ICD-10-CM

## 2018-06-17 DIAGNOSIS — Z88.8: ICD-10-CM

## 2018-06-17 DIAGNOSIS — Z90.710: ICD-10-CM

## 2018-06-17 DIAGNOSIS — E87.6: ICD-10-CM

## 2018-06-17 DIAGNOSIS — Z90.49: ICD-10-CM

## 2018-06-17 DIAGNOSIS — L03.115: Primary | ICD-10-CM

## 2018-06-17 DIAGNOSIS — I13.0: ICD-10-CM

## 2018-06-17 DIAGNOSIS — I48.91: ICD-10-CM

## 2018-06-17 DIAGNOSIS — I50.30: ICD-10-CM

## 2018-06-17 DIAGNOSIS — G89.29: ICD-10-CM

## 2018-06-17 DIAGNOSIS — N18.3: ICD-10-CM

## 2018-06-17 DIAGNOSIS — K21.9: ICD-10-CM

## 2018-06-17 DIAGNOSIS — F41.9: ICD-10-CM

## 2018-06-17 LAB
ALBUMIN SERPL-MCNC: 3.3 GM/DL (ref 3.1–4.5)
ALP SERPL-CCNC: 123 U/L (ref 45–117)
ALT SERPL W P-5'-P-CCNC: 13 U/L (ref 12–78)
APTT PPP: 25.7 SECONDS (ref 20.8–31.5)
AST SERPL-CCNC: 9 IU/L (ref 3–35)
BASOPHILS # BLD AUTO: 0.1 10*3/UL (ref 0–0.1)
BASOPHILS NFR BLD AUTO: 0.8 % (ref 0–1)
BUN SERPL-MCNC: 20 MG/DL (ref 7–24)
CHLORIDE SERPL-SCNC: 104 MMOL/L (ref 98–107)
CREAT SERPL-MCNC: 1.31 MG/DL (ref 0.55–1.02)
EOSINOPHIL # BLD AUTO: 0.2 10*3/UL (ref 0–0.4)
EOSINOPHIL # BLD AUTO: 2.8 % (ref 1–4)
ERYTHROCYTE [DISTWIDTH] IN BLOOD BY AUTOMATED COUNT: 14.2 % (ref 0–14.5)
HCT VFR BLD AUTO: 31.4 % (ref 37–47)
HGB BLD-MCNC: 9.4 G/DL (ref 12–16)
INR BLD: 0.9 (ref 2–3.5)
LYMPHOCYTES # BLD AUTO: 1.1 10*3/UL (ref 1.3–4.4)
LYMPHOCYTES NFR BLD AUTO: 14.1 % (ref 27–41)
MCH RBC QN AUTO: 30.3 PG (ref 27–31)
MCHC RBC AUTO-ENTMCNC: 29.9 G/DL (ref 33–37)
MCV RBC AUTO: 101.3 FL (ref 81–99)
MONOCYTES # BLD AUTO: 0.8 10*3/UL (ref 0.1–1)
MONOCYTES NFR BLD MANUAL: 10.2 % (ref 3–9)
NEUT #: 5.7 10*3/UL (ref 2.3–7.9)
NEUT %: 71.7 % (ref 47–73)
NRBC BLD QL AUTO: 0 10*3/UL (ref 0–0)
PLATELET # BLD AUTO: 249 10*3/UL (ref 130–400)
PMV BLD AUTO: 9.6 FL (ref 9.6–12.3)
POTASSIUM SERPL-SCNC: 3.6 MMOL/L (ref 3.5–5.1)
PROT SERPL-MCNC: 7.8 GM/DL (ref 6.4–8.2)
RBC # BLD AUTO: 3.1 10*6/UL (ref 4.1–5.1)
SODIUM SERPL-SCNC: 140 MMOL/L (ref 136–145)
WBC NRBC COR # BLD AUTO: 7.9 10*3/UL (ref 4.8–10.8)

## 2020-03-07 ENCOUNTER — HOSPITAL ENCOUNTER (EMERGENCY)
Dept: HOSPITAL 83 - ED | Age: 85
Discharge: HOME | End: 2020-03-07
Payer: MEDICARE

## 2020-03-07 VITALS — HEIGHT: 61.97 IN | WEIGHT: 135 LBS | BODY MASS INDEX: 24.84 KG/M2

## 2020-03-07 VITALS — DIASTOLIC BLOOD PRESSURE: 64 MMHG

## 2020-03-07 DIAGNOSIS — I48.91: ICD-10-CM

## 2020-03-07 DIAGNOSIS — M25.561: Primary | ICD-10-CM

## 2020-03-07 DIAGNOSIS — Z90.49: ICD-10-CM

## 2020-03-07 DIAGNOSIS — Z79.899: ICD-10-CM

## 2020-03-07 DIAGNOSIS — K21.9: ICD-10-CM

## 2020-03-07 DIAGNOSIS — F41.9: ICD-10-CM

## 2020-03-07 DIAGNOSIS — I11.0: ICD-10-CM

## 2020-03-07 DIAGNOSIS — G89.29: ICD-10-CM

## 2020-03-07 DIAGNOSIS — I50.30: ICD-10-CM

## 2020-03-07 DIAGNOSIS — Z91.041: ICD-10-CM

## 2020-03-07 DIAGNOSIS — M25.562: ICD-10-CM

## 2020-03-07 DIAGNOSIS — E78.5: ICD-10-CM

## 2020-03-07 LAB
ALBUMIN SERPL-MCNC: 3.5 GM/DL (ref 3.1–4.5)
ALP SERPL-CCNC: 79 U/L (ref 45–117)
ALT SERPL W P-5'-P-CCNC: 24 U/L (ref 12–78)
AST SERPL-CCNC: 27 IU/L (ref 3–35)
BASOPHILS # BLD AUTO: 0.1 10*3/UL (ref 0–0.1)
BASOPHILS NFR BLD AUTO: 0.8 % (ref 0–1)
BUN SERPL-MCNC: 21 MG/DL (ref 7–24)
CHLORIDE SERPL-SCNC: 104 MMOL/L (ref 98–107)
CREAT SERPL-MCNC: 1.59 MG/DL (ref 0.55–1.02)
EOSINOPHIL # BLD AUTO: 0.2 10*3/UL (ref 0–0.4)
EOSINOPHIL # BLD AUTO: 3.3 % (ref 1–4)
ERYTHROCYTE [DISTWIDTH] IN BLOOD BY AUTOMATED COUNT: 13.1 % (ref 0–14.5)
HCT VFR BLD AUTO: 38.6 % (ref 37–47)
HGB BLD-MCNC: 12.2 G/DL (ref 12–16)
LYMPHOCYTES # BLD AUTO: 1.1 10*3/UL (ref 1.3–4.4)
LYMPHOCYTES NFR BLD AUTO: 17.4 % (ref 27–41)
MCH RBC QN AUTO: 30.8 PG (ref 27–31)
MCHC RBC AUTO-ENTMCNC: 31.6 G/DL (ref 33–37)
MCV RBC AUTO: 97.5 FL (ref 81–99)
MONOCYTES # BLD AUTO: 0.5 10*3/UL (ref 0.1–1)
MONOCYTES NFR BLD MANUAL: 8.2 % (ref 3–9)
NEUT #: 4.5 10*3/UL (ref 2.3–7.9)
NEUT %: 70 % (ref 47–73)
NRBC BLD QL AUTO: 0 % (ref 0–0)
PLATELET # BLD AUTO: 182 10*3/UL (ref 130–400)
PMV BLD AUTO: 9.7 FL (ref 9.6–12.3)
POTASSIUM SERPL-SCNC: 3.5 MMOL/L (ref 3.5–5.1)
PROT SERPL-MCNC: 7.4 GM/DL (ref 6.4–8.2)
RBC # BLD AUTO: 3.96 10*6/UL (ref 4.1–5.1)
SODIUM SERPL-SCNC: 139 MMOL/L (ref 136–145)
WBC NRBC COR # BLD AUTO: 6.4 10*3/UL (ref 4.8–10.8)

## 2020-11-12 ENCOUNTER — HOSPITAL ENCOUNTER (OUTPATIENT)
Dept: HOSPITAL 83 - LAB | Age: 85
Discharge: HOME | End: 2020-11-12
Attending: ORTHOPAEDIC SURGERY
Payer: MEDICARE

## 2020-11-12 DIAGNOSIS — M17.0: Primary | ICD-10-CM

## 2020-11-12 DIAGNOSIS — I10: ICD-10-CM

## 2020-11-12 LAB
BASOPHILS # BLD AUTO: 0.1 10*3/UL (ref 0–0.1)
BASOPHILS NFR BLD AUTO: 1.5 % (ref 0–1)
EOSINOPHIL # BLD AUTO: 0.2 10*3/UL (ref 0–0.4)
EOSINOPHIL # BLD AUTO: 2.6 % (ref 1–4)
ERYTHROCYTE [DISTWIDTH] IN BLOOD BY AUTOMATED COUNT: 13.2 % (ref 0–14.5)
HCT VFR BLD AUTO: 36.1 % (ref 37–47)
LYMPHOCYTES # BLD AUTO: 1.7 10*3/UL (ref 1.3–4.4)
LYMPHOCYTES NFR BLD AUTO: 28.1 % (ref 27–41)
MCH RBC QN AUTO: 30.4 PG (ref 27–31)
MCHC RBC AUTO-ENTMCNC: 30.5 G/DL (ref 33–37)
MCV RBC AUTO: 99.7 FL (ref 81–99)
MONOCYTES # BLD AUTO: 0.7 10*3/UL (ref 0.1–1)
MONOCYTES NFR BLD MANUAL: 10.9 % (ref 3–9)
NEUT #: 3.4 10*3/UL (ref 2.3–7.9)
NEUT %: 56.6 % (ref 47–73)
NRBC BLD QL AUTO: 0 % (ref 0–0)
PLATELET # BLD AUTO: 174 10*3/UL (ref 130–400)
PMV BLD AUTO: 10.4 FL (ref 9.6–12.3)
RBC # BLD AUTO: 3.62 10*6/UL (ref 4.1–5.1)
WBC NRBC COR # BLD AUTO: 6.1 10*3/UL (ref 4.8–10.8)

## 2020-12-07 ENCOUNTER — HOSPITAL ENCOUNTER (OUTPATIENT)
Dept: HOSPITAL 83 - NM | Age: 85
Discharge: HOME | End: 2020-12-07
Attending: ORTHOPAEDIC SURGERY
Payer: MEDICARE

## 2020-12-07 DIAGNOSIS — Z96.651: ICD-10-CM

## 2020-12-07 DIAGNOSIS — Z96.652: ICD-10-CM

## 2020-12-07 DIAGNOSIS — M17.11: Primary | ICD-10-CM

## 2021-01-18 ENCOUNTER — HOSPITAL ENCOUNTER (OUTPATIENT)
Dept: HOSPITAL 83 - CARD | Age: 86
Discharge: HOME | End: 2021-01-18
Attending: INTERNAL MEDICINE
Payer: MEDICARE

## 2021-01-18 DIAGNOSIS — I34.0: Primary | ICD-10-CM

## 2021-01-18 DIAGNOSIS — I35.8: ICD-10-CM

## 2021-05-04 ENCOUNTER — HOSPITAL ENCOUNTER (OUTPATIENT)
Dept: HOSPITAL 83 - LAB | Age: 86
Discharge: HOME | End: 2021-05-04
Attending: STUDENT IN AN ORGANIZED HEALTH CARE EDUCATION/TRAINING PROGRAM
Payer: MEDICARE

## 2021-05-04 DIAGNOSIS — Z20.822: ICD-10-CM

## 2021-05-04 DIAGNOSIS — I48.11: ICD-10-CM

## 2021-05-04 DIAGNOSIS — Z01.812: Primary | ICD-10-CM

## 2021-08-14 ENCOUNTER — HOSPITAL ENCOUNTER (EMERGENCY)
Dept: HOSPITAL 83 - ED | Age: 86
Discharge: HOME | End: 2021-08-14
Payer: MEDICARE

## 2021-08-14 VITALS — SYSTOLIC BLOOD PRESSURE: 129 MMHG | DIASTOLIC BLOOD PRESSURE: 69 MMHG

## 2021-08-14 VITALS — HEIGHT: 60.98 IN | BODY MASS INDEX: 25.49 KG/M2 | WEIGHT: 135 LBS

## 2021-08-14 DIAGNOSIS — Z96.652: ICD-10-CM

## 2021-08-14 DIAGNOSIS — I48.91: ICD-10-CM

## 2021-08-14 DIAGNOSIS — Z90.49: ICD-10-CM

## 2021-08-14 DIAGNOSIS — Z79.899: ICD-10-CM

## 2021-08-14 DIAGNOSIS — Z98.890: ICD-10-CM

## 2021-08-14 DIAGNOSIS — Z90.89: ICD-10-CM

## 2021-08-14 DIAGNOSIS — Z90.711: ICD-10-CM

## 2021-08-14 DIAGNOSIS — I47.1: Primary | ICD-10-CM

## 2021-08-14 DIAGNOSIS — Z91.041: ICD-10-CM

## 2021-08-14 DIAGNOSIS — F41.9: ICD-10-CM

## 2021-08-14 LAB
ALBUMIN SERPL-MCNC: 3.5 GM/DL (ref 3.1–4.5)
ALP SERPL-CCNC: 89 U/L (ref 45–117)
ALT SERPL W P-5'-P-CCNC: 16 U/L (ref 12–78)
AST SERPL-CCNC: 17 IU/L (ref 3–35)
BASOPHILS # BLD AUTO: 0.1 10*3/UL (ref 0–0.1)
BASOPHILS NFR BLD AUTO: 1.1 % (ref 0–1)
BUN SERPL-MCNC: 23 MG/DL (ref 7–24)
CHLORIDE SERPL-SCNC: 110 MMOL/L (ref 98–107)
CREAT SERPL-MCNC: 1.47 MG/DL (ref 0.55–1.02)
EOSINOPHIL # BLD AUTO: 0.1 10*3/UL (ref 0–0.4)
EOSINOPHIL # BLD AUTO: 1.5 % (ref 1–4)
EPI CELLS #/AREA URNS HPF: (no result) /[HPF]
ERYTHROCYTE [DISTWIDTH] IN BLOOD BY AUTOMATED COUNT: 12.9 % (ref 0–14.5)
HCT VFR BLD AUTO: 36.9 % (ref 37–47)
LYMPHOCYTES # BLD AUTO: 1.2 10*3/UL (ref 1.3–4.4)
LYMPHOCYTES NFR BLD AUTO: 19.8 % (ref 27–41)
MCH RBC QN AUTO: 30.7 PG (ref 27–31)
MCHC RBC AUTO-ENTMCNC: 31.4 G/DL (ref 33–37)
MCV RBC AUTO: 97.6 FL (ref 81–99)
MONOCYTES # BLD AUTO: 0.5 10*3/UL (ref 0.1–1)
MONOCYTES NFR BLD MANUAL: 7.4 % (ref 3–9)
NEUT #: 4.3 10*3/UL (ref 2.3–7.9)
NEUT %: 69.9 % (ref 47–73)
NRBC BLD QL AUTO: 0 10*3/UL (ref 0–0)
PH UR STRIP: 5 [PH] (ref 4.5–8)
PLATELET # BLD AUTO: 183 10*3/UL (ref 130–400)
PMV BLD AUTO: 10.2 FL (ref 9.6–12.3)
POTASSIUM SERPL-SCNC: 3.8 MMOL/L (ref 3.5–5.1)
PROT SERPL-MCNC: 7.6 GM/DL (ref 6.4–8.2)
RBC # BLD AUTO: 3.78 10*6/UL (ref 4.1–5.1)
SODIUM SERPL-SCNC: 142 MMOL/L (ref 136–145)
SP GR UR: <= 1.005 (ref 1–1.03)
TROPONIN I SERPL-MCNC: < 0.015 NG/ML (ref ?–0.04)
UROBILINOGEN UR STRIP-MCNC: 0.2 E.U./DL (ref 0–1)
WBC #/AREA URNS HPF: (no result) WBC/HPF (ref 0–5)
WBC NRBC COR # BLD AUTO: 6.1 10*3/UL (ref 4.8–10.8)

## 2021-08-25 ENCOUNTER — HOSPITAL ENCOUNTER (OUTPATIENT)
Dept: HOSPITAL 83 - CARD | Age: 86
Discharge: HOME | End: 2021-08-25
Attending: INTERNAL MEDICINE
Payer: MEDICARE

## 2021-08-25 DIAGNOSIS — R07.9: Primary | ICD-10-CM

## 2021-09-24 ENCOUNTER — HOSPITAL ENCOUNTER (EMERGENCY)
Dept: HOSPITAL 83 - ED | Age: 86
LOS: 4 days | Discharge: ANOTHER HOSPITAL | End: 2021-09-28
Payer: MEDICARE

## 2021-09-24 VITALS — WEIGHT: 130 LBS | HEIGHT: 60 IN | BODY MASS INDEX: 25.52 KG/M2

## 2021-09-24 DIAGNOSIS — I49.5: Primary | ICD-10-CM

## 2021-09-24 DIAGNOSIS — Z79.899: ICD-10-CM

## 2021-09-24 LAB
ALBUMIN SERPL-MCNC: 3.2 GM/DL (ref 3.1–4.5)
ALP SERPL-CCNC: 95 U/L (ref 45–117)
ALT SERPL W P-5'-P-CCNC: 18 U/L (ref 12–78)
APTT PPP: 29.9 SECONDS (ref 20–32.1)
AST SERPL-CCNC: 17 IU/L (ref 3–35)
BASOPHILS # BLD AUTO: 0.1 10*3/UL (ref 0–0.1)
BASOPHILS NFR BLD AUTO: 1.4 % (ref 0–1)
BUN SERPL-MCNC: 22 MG/DL (ref 7–24)
CHLORIDE SERPL-SCNC: 106 MMOL/L (ref 98–107)
CREAT SERPL-MCNC: 1.51 MG/DL (ref 0.55–1.02)
EOSINOPHIL # BLD AUTO: 0.2 10*3/UL (ref 0–0.4)
EOSINOPHIL # BLD AUTO: 2.9 % (ref 1–4)
ERYTHROCYTE [DISTWIDTH] IN BLOOD BY AUTOMATED COUNT: 13 % (ref 0–14.5)
HCT VFR BLD AUTO: 36.8 % (ref 37–47)
INR BLD: 1 (ref 2–3.5)
LYMPHOCYTES # BLD AUTO: 1.3 10*3/UL (ref 1.3–4.4)
LYMPHOCYTES NFR BLD AUTO: 23.7 % (ref 27–41)
MCH RBC QN AUTO: 31.3 PG (ref 27–31)
MCHC RBC AUTO-ENTMCNC: 31.3 G/DL (ref 33–37)
MCV RBC AUTO: 100 FL (ref 81–99)
MONOCYTES # BLD AUTO: 0.5 10*3/UL (ref 0.1–1)
MONOCYTES NFR BLD MANUAL: 8 % (ref 3–9)
NEUT #: 3.6 10*3/UL (ref 2.3–7.9)
NEUT %: 63.8 % (ref 47–73)
NRBC BLD QL AUTO: 0 % (ref 0–0)
PLATELET # BLD AUTO: 184 10*3/UL (ref 130–400)
PMV BLD AUTO: 10.1 FL (ref 9.6–12.3)
POTASSIUM SERPL-SCNC: 4.3 MMOL/L (ref 3.5–5.1)
PROT SERPL-MCNC: 7.7 GM/DL (ref 6.4–8.2)
RBC # BLD AUTO: 3.68 10*6/UL (ref 4.1–5.1)
SODIUM SERPL-SCNC: 141 MMOL/L (ref 136–145)
TROPONIN I SERPL-MCNC: < 0.015 NG/ML (ref ?–0.04)
WBC NRBC COR # BLD AUTO: 5.6 10*3/UL (ref 4.8–10.8)

## 2021-09-28 VITALS — DIASTOLIC BLOOD PRESSURE: 68 MMHG

## 2021-11-27 ENCOUNTER — HOSPITAL ENCOUNTER (EMERGENCY)
Dept: HOSPITAL 83 - ED | Age: 86
Discharge: ANOTHER HOSPITAL | End: 2021-11-27
Payer: MEDICARE

## 2021-11-27 VITALS — WEIGHT: 130 LBS | HEIGHT: 55 IN

## 2021-11-27 VITALS — SYSTOLIC BLOOD PRESSURE: 111 MMHG | DIASTOLIC BLOOD PRESSURE: 61 MMHG

## 2021-11-27 DIAGNOSIS — I50.9: ICD-10-CM

## 2021-11-27 DIAGNOSIS — Z79.899: Primary | ICD-10-CM

## 2021-11-27 DIAGNOSIS — I11.0: ICD-10-CM

## 2021-11-27 DIAGNOSIS — K21.9: ICD-10-CM

## 2021-11-27 DIAGNOSIS — Z86.73: ICD-10-CM

## 2021-11-27 DIAGNOSIS — E78.5: ICD-10-CM

## 2021-11-27 LAB
ALBUMIN SERPL-MCNC: 2.8 GM/DL (ref 3.1–4.5)
ALP SERPL-CCNC: 115 U/L (ref 45–117)
ALT SERPL W P-5'-P-CCNC: 19 U/L (ref 12–78)
APTT PPP: 41.3 SECONDS (ref 20–32.1)
AST SERPL-CCNC: 30 IU/L (ref 3–35)
BASOPHILS # BLD AUTO: 0 10*3/UL (ref 0–0.1)
BASOPHILS NFR BLD AUTO: 0.5 % (ref 0–1)
BUN SERPL-MCNC: 18 MG/DL (ref 7–24)
BUN SERPL-MCNC: 19 MG/DL (ref 7–24)
CHLORIDE SERPL-SCNC: 104 MMOL/L (ref 98–107)
CHLORIDE SERPL-SCNC: 114 MMOL/L (ref 98–107)
CREAT SERPL-MCNC: 1.49 MG/DL (ref 0.55–1.02)
CREAT SERPL-MCNC: 1.7 MG/DL (ref 0.55–1.02)
EOSINOPHIL # BLD AUTO: 0 10*3/UL (ref 0–0.4)
EOSINOPHIL # BLD AUTO: 0.2 % (ref 1–4)
ERYTHROCYTE [DISTWIDTH] IN BLOOD BY AUTOMATED COUNT: 14.1 % (ref 0–14.5)
HCT VFR BLD AUTO: 31.7 % (ref 37–47)
INR BLD: 1.6 (ref 2–3.5)
LYMPHOCYTES # BLD AUTO: 0.8 10*3/UL (ref 1.3–4.4)
LYMPHOCYTES NFR BLD AUTO: 13.6 % (ref 27–41)
MCH RBC QN AUTO: 29.7 PG (ref 27–31)
MCHC RBC AUTO-ENTMCNC: 31.9 G/DL (ref 33–37)
MCV RBC AUTO: 93.2 FL (ref 81–99)
MONOCYTES # BLD AUTO: 0.4 10*3/UL (ref 0.1–1)
MONOCYTES NFR BLD MANUAL: 6.8 % (ref 3–9)
NEUT #: 4.7 10*3/UL (ref 2.3–7.9)
NEUT %: 78.1 % (ref 47–73)
NRBC BLD QL AUTO: 0 % (ref 0–0)
PLATELET # BLD AUTO: 301 10*3/UL (ref 130–400)
PMV BLD AUTO: 10 FL (ref 9.6–12.3)
POTASSIUM SERPL-SCNC: 2.5 MMOL/L (ref 3.5–5.1)
POTASSIUM SERPL-SCNC: 4.5 MMOL/L (ref 3.5–5.1)
PROT SERPL-MCNC: 7.1 GM/DL (ref 6.4–8.2)
RBC # BLD AUTO: 3.4 10*6/UL (ref 4.1–5.1)
SODIUM SERPL-SCNC: 136 MMOL/L (ref 136–145)
SODIUM SERPL-SCNC: 141 MMOL/L (ref 136–145)
WBC NRBC COR # BLD AUTO: 6 10*3/UL (ref 4.8–10.8)